# Patient Record
Sex: MALE | Race: WHITE
[De-identification: names, ages, dates, MRNs, and addresses within clinical notes are randomized per-mention and may not be internally consistent; named-entity substitution may affect disease eponyms.]

---

## 2019-03-05 ENCOUNTER — HOSPITAL ENCOUNTER (OUTPATIENT)
Dept: HOSPITAL 75 - ONC | Age: 84
LOS: 90 days | Discharge: HOME | End: 2019-06-03
Attending: RADIOLOGY
Payer: MEDICARE

## 2019-03-05 DIAGNOSIS — C61: Primary | ICD-10-CM

## 2019-03-05 PROCEDURE — 99204 OFFICE O/P NEW MOD 45 MIN: CPT

## 2019-04-10 ENCOUNTER — HOSPITAL ENCOUNTER (OUTPATIENT)
Dept: HOSPITAL 75 - PREOP | Age: 84
Discharge: HOME | End: 2019-04-10
Attending: RADIOLOGY
Payer: MEDICARE

## 2019-04-10 VITALS — HEIGHT: 71 IN | WEIGHT: 195 LBS | BODY MASS INDEX: 27.3 KG/M2

## 2019-04-10 DIAGNOSIS — Z01.818: Primary | ICD-10-CM

## 2019-04-17 ENCOUNTER — HOSPITAL ENCOUNTER (OUTPATIENT)
Dept: HOSPITAL 75 - SDC | Age: 84
Discharge: HOME | End: 2019-04-17
Attending: RADIOLOGY
Payer: MEDICARE

## 2019-04-17 VITALS — WEIGHT: 195 LBS | HEIGHT: 71 IN | BODY MASS INDEX: 27.3 KG/M2

## 2019-04-17 VITALS — SYSTOLIC BLOOD PRESSURE: 130 MMHG | DIASTOLIC BLOOD PRESSURE: 89 MMHG

## 2019-04-17 VITALS — SYSTOLIC BLOOD PRESSURE: 153 MMHG | DIASTOLIC BLOOD PRESSURE: 84 MMHG

## 2019-04-17 VITALS — SYSTOLIC BLOOD PRESSURE: 164 MMHG | DIASTOLIC BLOOD PRESSURE: 85 MMHG

## 2019-04-17 DIAGNOSIS — I10: ICD-10-CM

## 2019-04-17 DIAGNOSIS — Z79.899: ICD-10-CM

## 2019-04-17 DIAGNOSIS — Z95.1: ICD-10-CM

## 2019-04-17 DIAGNOSIS — M19.91: ICD-10-CM

## 2019-04-17 DIAGNOSIS — K21.9: ICD-10-CM

## 2019-04-17 DIAGNOSIS — H40.9: ICD-10-CM

## 2019-04-17 DIAGNOSIS — Z79.82: ICD-10-CM

## 2019-04-17 DIAGNOSIS — I25.10: ICD-10-CM

## 2019-04-17 DIAGNOSIS — Z87.891: ICD-10-CM

## 2019-04-17 DIAGNOSIS — C61: Primary | ICD-10-CM

## 2019-04-17 DIAGNOSIS — E78.00: ICD-10-CM

## 2019-04-17 PROCEDURE — 87081 CULTURE SCREEN ONLY: CPT

## 2019-04-17 NOTE — PROGRESS NOTE-POST OPERATIVE
Post-Operative Progess Note


Surgeon (s)/Assistant (s)


Surgeon


DAVID CLEMENS MD


Assistant:  DUANE MYERS MD





Pre-Operative Diagnosis


Prostate cancer cT2c, PSA 5.9, [place] 6





Post-Operative Diagnosis





Same as pre-op





Procedure & Operative Findings


Date of Procedure


4/17/19


Procedure Performed/Findings


1)Placement of fiducial gold seed markers


2)Injection of biodegradable hydrogel prostate-rectal spacer utilizing the 

SpaceO[employer] system


Anesthesia Type


General





Estimated Blood Loss


Estimated blood loss (mL):  Minimal





Specimens/Packing


Specimens Removed


None


Packing:  


None











MYERS,DUANE E MD Apr 17, 2019 14:30

## 2019-04-17 NOTE — DISCHARGE INST-SIMPLE/STANDARD
Discharge Inst-Standard


Discharge Medications


New, Converted or Re-Newed RX:  Other (already has antibiotic with instructions)





Patient Instructions/Follow Up


Plan of Care/Instructions/FU:  


1)Hamida will call and check status one week post op


2)April 30, 2019 arrive at [facility] at 10:00 a.m. -


paperwork already given to pt's family


Activity as Tolerated:  Yes


Discharge Diet:  No Restrictions











MYERS,DUANE E MD Apr 17, 2019 12:20

## 2019-04-17 NOTE — ANESTHESIA-GENERAL POST-OP
General


Patient Condition


Mental Status/LOC:  Same as Preop


Cardiovascular:  Satisfactory


Nausea/Vomiting:  Absent


Respiratory:  Satisfactory


Pain:  Controlled


Complications:  Absent





Post Op Complications


Complications


None





Follow Up Care/Instructions


Patient Instructions


None needed.





Anesthesia/Patient Condition


Patient Condition


Patient is doing well, no complaints, stable vital signs, no apparent adverse 

anesthesia problems.   


No complications reported per nursing.


D/C home per [facility] Criteria:  Yes











RHIANNON RESTREPO CRNA Apr 17, 2019 14:01

## 2019-04-17 NOTE — PROGRESS NOTE-PRE OPERATIVE
Pre-Operative Progress Note


H&P Reviewed


The H&P was reviewed, patient examined and no changes noted.


Date Seen by Provider:  Apr 17, 2019


Time Seen by Provider:  11:55


Date H&P Reviewed:  Apr 17, 2019


Time H&P Reviewed:  11:55


Pre-Operative Diagnosis:  Prostate cancer cT2c, PSA 5.9, [place] 6











MYERS,DUANE E MD Apr 17, 2019 12:16